# Patient Record
Sex: MALE | Race: WHITE | NOT HISPANIC OR LATINO | Employment: FULL TIME | URBAN - NONMETROPOLITAN AREA
[De-identification: names, ages, dates, MRNs, and addresses within clinical notes are randomized per-mention and may not be internally consistent; named-entity substitution may affect disease eponyms.]

---

## 2017-01-12 PROCEDURE — 87186 SC STD MICRODIL/AGAR DIL: CPT | Performed by: NURSE PRACTITIONER

## 2017-01-12 PROCEDURE — 87088 URINE BACTERIA CULTURE: CPT | Performed by: NURSE PRACTITIONER

## 2017-01-12 PROCEDURE — 87086 URINE CULTURE/COLONY COUNT: CPT | Performed by: NURSE PRACTITIONER

## 2017-03-14 ENCOUNTER — APPOINTMENT (OUTPATIENT)
Dept: GENERAL RADIOLOGY | Facility: HOSPITAL | Age: 22
End: 2017-03-14

## 2017-03-14 PROCEDURE — 73562 X-RAY EXAM OF KNEE 3: CPT

## 2021-03-18 ENCOUNTER — IMMUNIZATION (OUTPATIENT)
Dept: VACCINE CLINIC | Facility: HOSPITAL | Age: 26
End: 2021-03-18

## 2021-03-18 PROCEDURE — 91301 HC SARSCO02 VAC 100MCG/0.5ML IM: CPT | Performed by: OBSTETRICS & GYNECOLOGY

## 2021-03-18 PROCEDURE — 0011A: CPT | Performed by: OBSTETRICS & GYNECOLOGY

## 2021-04-15 ENCOUNTER — IMMUNIZATION (OUTPATIENT)
Dept: VACCINE CLINIC | Facility: HOSPITAL | Age: 26
End: 2021-04-15

## 2021-04-15 PROCEDURE — 0012A: CPT | Performed by: OBSTETRICS & GYNECOLOGY

## 2021-04-15 PROCEDURE — 91301 HC SARSCO02 VAC 100MCG/0.5ML IM: CPT | Performed by: OBSTETRICS & GYNECOLOGY

## 2024-11-30 ENCOUNTER — HOSPITAL ENCOUNTER (EMERGENCY)
Facility: HOSPITAL | Age: 29
Discharge: HOME | End: 2024-11-30
Payer: COMMERCIAL

## 2024-11-30 ENCOUNTER — APPOINTMENT (OUTPATIENT)
Dept: GENERAL RADIOLOGY | Facility: HOSPITAL | Age: 29
End: 2024-11-30
Payer: COMMERCIAL

## 2024-11-30 VITALS
TEMPERATURE: 98 F | DIASTOLIC BLOOD PRESSURE: 90 MMHG | WEIGHT: 260 LBS | RESPIRATION RATE: 16 BRPM | HEART RATE: 118 BPM | SYSTOLIC BLOOD PRESSURE: 150 MMHG | BODY MASS INDEX: 32.33 KG/M2 | OXYGEN SATURATION: 95 % | HEIGHT: 75 IN

## 2024-11-30 DIAGNOSIS — M76.61 TENDONITIS, ACHILLES, RIGHT: Primary | ICD-10-CM

## 2024-11-30 DIAGNOSIS — M79.661 RIGHT CALF PAIN: ICD-10-CM

## 2024-11-30 LAB — D DIMER PPP FEU-MCNC: 0.36 MCGFEU/ML (ref 0–0.5)

## 2024-11-30 PROCEDURE — 99283 EMERGENCY DEPT VISIT LOW MDM: CPT

## 2024-11-30 PROCEDURE — 73590 X-RAY EXAM OF LOWER LEG: CPT

## 2024-11-30 PROCEDURE — 85379 FIBRIN DEGRADATION QUANT: CPT

## 2024-11-30 PROCEDURE — 36415 COLL VENOUS BLD VENIPUNCTURE: CPT

## 2024-11-30 RX ORDER — PREDNISONE 20 MG/1
40 TABLET ORAL DAILY
Qty: 10 TABLET | Refills: 0 | Status: SHIPPED | OUTPATIENT
Start: 2024-11-30 | End: 2024-12-05

## 2024-11-30 RX ORDER — IBUPROFEN 600 MG/1
600 TABLET, FILM COATED ORAL 3 TIMES DAILY
Qty: 15 TABLET | Refills: 0 | Status: SHIPPED | OUTPATIENT
Start: 2024-11-30 | End: 2024-12-05

## 2024-11-30 NOTE — DISCHARGE INSTRUCTIONS
It was very nice to meet you, Lauri. Thank you for allowing us to take care of you today at [facility].    Today you were seen in the emergency department for your symptoms. Please understand that an ER evaluation is just the start of your evaluation. We do the best we can, but we are often unable to fully find what is causing your symptoms from one evaluation.  Because of this, the goal is to determine whether you need to be evaluated in the hospital or if it is safe for you to go home and see other doctors provided such as primary care physicians or specialist on an outpatient basis.     Like we discussed, I strongly urge that you follow up with your primary care doctor and orthopedics. Please call their office to set up an appointment as soon as possible so that you can be re-evaluated for improvement in your symptoms or for any other questions.  I have provided the information needed, including phone number, to call to set up an appointment below in these discharge papers.     Educational material has also been provided in the following pages regarding what we have discussed today.     MEDICATIONS PRESCRIBED: Steroids and NSAIDs as prescribed in addition to intermittent ice/heat application and rest.    Please return to the emergency room within 12-48 hours if you experience symptoms such as the following:   Fever, chills, chest pain or shortness of breath, pain with inspiration/expiration, pain that travels to your arms, neck or back, nausea, vomiting, severe headache, tearing pain in your chest, dizziness, feel as though you are about to pass out, OR if you have any worsening symptoms, or any other concerns.

## 2024-11-30 NOTE — Clinical Note
[facility] EMERGENCY DEPARTMENT  [address] [place] [address] AVE  [facility] [address]  Phone: [phone]    Lauri Velez was seen and treated in our emergency department on 11/30/2024.  He may return to work on 12/02/2024.         Thank you for choosing [facility].    Ham Galloway, APRN

## 2024-11-30 NOTE — ED PROVIDER NOTES
"Subjective   History of Present Illness  Patient is a 29-year-old male that presents to the emergency department sent from urgent care for complaints of a \"knot\" and intermittent pain to the right calf.  Patient reports this has been ongoing for the last week.  Denies any recent fall, trauma, or injury to cause pain.  Patient states he has been struggling with sciatica radiating down into the right lower extremity for the last few weeks but reports this is not pain that is consistent with sciatica.  Patient states the \"knot\" is not noticeable currently and it comes and goes in size and discomfort periodically.  Describes pain as a muscle pain.  Denies any numbness, tingling, or loss of sensation to either lower extremity.  Patient denies any recent travel.  Denies any hormone use.  Denies any tobacco or smoking history.  Denies any chest pain or shortness of breath.  Denies any lightheadedness, dizziness, blurred vision, headache or near syncope.  Denies any fevers, body aches, chills, cough or congestion.  Denies any abdominal pain, nausea, vomiting, constipation or diarrhea.  Denies any issues with ambulation.  Patient reports no significant past medical history and reports he takes no daily medications.        Review of Systems   Musculoskeletal:  Positive for arthralgias and myalgias.   All other systems reviewed and are negative.      Past Medical History:   Diagnosis Date    Fibrous dysplasia (monostotic), left ankle and foot        No Known Allergies    History reviewed. No pertinent surgical history.    History reviewed. No pertinent family history.    Social History     Socioeconomic History    Marital status: Unknown   Tobacco Use    Smoking status: Never    Smokeless tobacco: Never   Vaping Use    Vaping status: Never Used   Substance and Sexual Activity    Alcohol use: No    Drug use: No    Sexual activity: Defer           Objective   Physical Exam  Vitals and nursing note reviewed.   Constitutional:     "   Appearance: Normal appearance.      Comments: Nontoxic appearing. In no acute distress.    HENT:      Head: Normocephalic and atraumatic.      Right Ear: External ear normal.      Left Ear: External ear normal.      Nose: Nose normal.      Mouth/Throat:      Mouth: Mucous membranes are moist.      Pharynx: Oropharynx is clear.   Eyes:      Extraocular Movements: Extraocular movements intact.      Conjunctiva/sclera: Conjunctivae normal.      Pupils: Pupils are equal, round, and reactive to light.   Cardiovascular:      Rate and Rhythm: Normal rate.      Pulses: Normal pulses.      Heart sounds: Normal heart sounds.   Pulmonary:      Effort: Pulmonary effort is normal. No respiratory distress.      Breath sounds: Normal breath sounds. No wheezing.   Chest:      Chest wall: No tenderness.   Abdominal:      General: Bowel sounds are normal. There is no distension.      Palpations: Abdomen is soft.      Tenderness: There is no abdominal tenderness. There is no right CVA tenderness, left CVA tenderness, guarding or rebound.   Musculoskeletal:         General: Tenderness present. Normal range of motion.      Cervical back: Normal range of motion and neck supple.      Right lower leg: No edema.      Left lower leg: No edema.      Comments: No obvious signs of edema or swelling noted to either lower extremity.  No palpable knot or mass noted to the right lower extremity.  Patient does report minor tenderness upon palpation localized to the  right calf.  No erythema, warmth noted to touch or wound noted.  Patient denies any numbness, tingling, or loss of sensation to the affected extremity.  Pulses are palpable, strong equal bilaterally.  Normal cap refill.  Patient appears to be neurovascular intact.   Skin:     General: Skin is warm and dry.      Capillary Refill: Capillary refill takes less than 2 seconds.   Neurological:      General: No focal deficit present.      Mental Status: He is alert and oriented to person,  "place, and time. Mental status is at baseline.   Psychiatric:         Mood and Affect: Mood normal.         Behavior: Behavior normal.         Thought Content: Thought content normal.         Judgment: Judgment normal.       Labs Reviewed   D-DIMER, QUANTITATIVE - Normal    Narrative:     According to the assay 's published package insert, a normal (<0.50 MCGFEU/mL) D-dimer result in conjunction with a non-high clinical probability assessment, excludes deep vein thrombosis (DVT) and pulmonary embolism (PE) with high sensitivity.    D-dimer values increase with age and this can make VTE exclusion of an older population difficult. To address this, the American College of Phys[employer], based on best available evidence and recent guidelines, recommends that clinicians use age-adjusted D-dimer thresholds in patients greater than 50 years of age with: a) a low probability of PE who do not meet all Pulmonary Embolism Rule Out Criteria, or b) in those with intermediate probability of PE.   The formula for an age-adjusted D-dimer cut-off is \"age/100\".  For example, a 60 year old patient would have an age-adjusted cut-off of 0.60 MCGFEU/mL and an 80 year old 0.80 MCGFEU/mL.      XR Tibia Fibula 2 View Right   Final Result   No fracture, no acute osseous abnormality.       This report was signed and finalized on 11/30/2024 2:56 PM by Dr. Abdiaziz Cheng MD.               Procedures           ED Course                                                       Medical Decision Making  Lauri Velez is a 29 y.o. male who presents to the ED  sent from urgent care for complaints of a \"knot\" and intermittent pain to the right calf.  Patient reports this has been ongoing for the last week.  Denies any recent fall, trauma, or injury to cause pain.  Patient states he has been struggling with sciatica radiating down into the right lower extremity for the last few weeks but reports this is not pain that is consistent with " "sciatica.  Patient states the \"knot\" is not noticeable currently and it comes and goes in size and discomfort periodically.  Describes pain as a muscle pain.  Denies any numbness, tingling, or loss of sensation to either lower extremity.  Patient denies any recent travel.  Denies any hormone use.  Denies any tobacco or smoking history.  Denies any chest pain or shortness of breath.  Denies any lightheadedness, dizziness, blurred vision, headache or near syncope.  Denies any fevers, body aches, chills, cough or congestion.  Denies any abdominal pain, nausea, vomiting, constipation or diarrhea.  Denies any issues with ambulation.  Patient reports no significant past medical history and reports he takes no daily medications.    Patient was non-toxic appearing on arrival. No acute distress was noted.  Vital signs stable.     Patient's presentation raises suspicion for differentials including, but not limited to, DVT, muscle strain, lipoma, fracture.      Past medical history, surgical history, and medication regimen reviewed.     Previous notes, labs, imaging, and more reviewed.    Wells score for DVT: 1 point, low risk. D-dimer normal.     Please refer to above section of note for lab and imaging results that were reviewed and interpreted by radiology as well as attending physician.     Given findings described above, patient's presentation is likely consistent with tendinitis.     I had an in-depth discussion with the patient regarding reassuring physical exam findings as there is no evidence of a not or mass noted to the right calf and there is no signs of swelling, erythema, or edema noted to the right lower extremity.  Discussed that D-dimer was negative.  Also discussed that x-ray reveals a small posterior spur is seen at the Achilles insertionon the calcaneus that could be causing some of patient's symptoms.  Discussed that patient will be treated for tendinitis and will be discharged with prescriptions for " steroids and anti-inflammatories.  Discussed that patient will need to use intermittent ice and heat application to help with pain and symptom management in addition to following up with orthopedics for further evaluation and treatment of continued symptoms.  Patient was educated on concerning signs and symptoms that warrant a quick return to ED and verbalized understanding of this. I answered all the questions regarding the emergency department evaluation, diagnosis, and treatment plan in plain and simple language that was understandable. We discussed that due to always having some diagnostic uncertainty while in the ER, there is always a chance that symptoms may change or new symptoms may reveal themselves after being discharged. Because of this, I stressed the importance of Lauri following up with their PCP and orthopedics. Patient informed that appointment will need to be done by calling their office to set up an appointment within the next few days or as soon as reasonably possible so that the symptoms can be re-evaluated for improvement or for any other questions. I also gave Lauri common sense return precautions and prompted patient to return to the emergency department within 24 - 48hrs if there are any new, worsening, or concerning symptoms. The patient verbalized understanding of the discharge instructions and agreed with them. Lauri was discharged in stable condition.     Dragon disclaimer:  Parts of this note may be an electronic transcription/translation of spoken language to printed text using the Dragon dictation system.       Problems Addressed:  Right calf pain: complicated acute illness or injury  Tendonitis, Achilles, right: complicated acute illness or injury    Amount and/or Complexity of Data Reviewed  Radiology: ordered.    Risk  Prescription drug management.        Final diagnoses:   Tendonitis, Achilles, right   Right calf pain       ED Disposition  ED Disposition       ED Disposition    Discharge    Condition   Stable    Comment   --               Masoud Wise MD  [address]  [address]  [facility] [address]  [phone]    Schedule an appointment as soon as possible for a visit       [facility] ORTHOPEDICS  [address] [facility] [address] [phone]  Schedule an appointment as soon as possible for a visit       [facility] EMERGENCY DEPARTMENT  [address]  [facility] [address] [phone]    If symptoms worsen         Medication List        New Prescriptions      ibuprofen 600 MG tablet  Commonly known as: ADVIL,MOTRIN  Take 1 tablet by mouth 3 (Three) Times a Day for 5 days.     predniSONE 20 MG tablet  Commonly known as: DELTASONE  Take 2 tablets by mouth Daily for 5 days.               Where to Get Your Medications        These medications [facility] [place] [facility] [address] - [phone]  - [phone]   [address] [facility] [address]      Phone: [phone]   ibuprofen 600 MG tablet  predniSONE 20 MG tablet            Ham Galloway, APRN  11/30/24 [id number]

## 2024-12-16 ENCOUNTER — OFFICE VISIT (OUTPATIENT)
Age: 29
End: 2024-12-16
Payer: COMMERCIAL

## 2024-12-16 VITALS — WEIGHT: 260 LBS | HEIGHT: 75 IN | BODY MASS INDEX: 32.33 KG/M2

## 2024-12-16 DIAGNOSIS — M76.61 TENDONITIS, ACHILLES, RIGHT: Primary | ICD-10-CM

## 2024-12-16 DIAGNOSIS — M79.671 RIGHT FOOT PAIN: ICD-10-CM

## 2024-12-16 DIAGNOSIS — M67.01 CONTRACTURE OF BOTH ACHILLES TENDONS: ICD-10-CM

## 2024-12-16 DIAGNOSIS — M67.02 CONTRACTURE OF BOTH ACHILLES TENDONS: ICD-10-CM

## 2024-12-16 PROCEDURE — 99203 OFFICE O/P NEW LOW 30 MIN: CPT | Performed by: PODIATRIST

## 2024-12-16 RX ORDER — METHYLPREDNISOLONE 4 MG/1
TABLET ORAL
Qty: 1 KIT | Refills: 0 | Status: SHIPPED | OUTPATIENT
Start: 2024-12-16

## 2024-12-16 NOTE — PATIENT INSTRUCTIONS
Follow up after labs and x-rays are done       
Then use your toes to push the towel back into place.  Repeat 8 to 12 times.  It's a good idea to repeat these steps with your other foot.  Make this exercise more challenging by placing a weighted object, such as a soup can, on the other end of the towel.  Oden pickups    Put some marbles, dice, or small smooth rocks on the floor next to a cup.  Sit in a chair, and use the toes of your affected foot to lift up one item from the floor. Then try to put the item in the cup.  Repeat 8 to 12 times.  It's a good idea to repeat these steps with your other foot.  Follow-up care is a key part of your treatment and safety. Be sure to make and go to all appointments, and call your doctor if you are having problems. It's also a good idea to know your test results and keep a list of the medicines you take.  Current as of: July 17, 2023  Content Version: 14.2  © 2024 [employer].   Care instructions adapted under license by [employer]. If you have questions about a medical condition or this instruction, always ask your healthcare professional. [employer] disclaims any warranty or liability for your use of this information.         This is one of the recommended inserts.  These can be found on Amazon as well as at some local pharmacies.

## 2024-12-16 NOTE — PROGRESS NOTES
[facility]  [facility] ORTHOPEDICS  [address]  [facility] [id number] [phone]     Patient: Artem Madera   YOB: 1995   Date: 12/16/2024   Visit Type:  Consult    Body Part: Foot right    When did the symptoms being/Date of Onset or date of surgery? Sign/ Symptoms Started 11/20/2024    Went to [facility] due to the pain being uncontrollable 10 days later       Where did the injury happen? OTHER-     How did the injury happen? Pt states its over use of being on his feet all day. The pain started becoming a problem within the last few weeks.     If over 55, have you bell an Osteoporosis Screening in the last 2 years? NA    History of Present Illness  Chief Complaint   Patient presents with    Consultation Right Foot        This is a 29 y.o. male  presents today complaining of pain in his right foot.  Mainly in the back of the heel.  He does relate tightness in both of his legs right greater than left.  Denies any trauma or injury.  Relates intermittent of several months duration.  Gradual onset.  Progressively worse with time.  No previous treatments.  Pain level can be 6-7 of 10 with 10 being the worst.    Review of Systems  System  Neg/Pos  Details  Constitutional  Negative  Chills, Fatigue, Fever and Night Sweats  Respiratory  Negative  Chest Pain, Cough and Dyspnea  Cardio   Negative  Leg Swelling  GI   Negative  Abdominal Pain, Constipation, Nausea and Vomiting     Negative  Urinary Incontinence   Endocrine  Negative  Weight Gain and Weight Loss  MS   Negative  Except as noted in HPI and Chief Complaint    History reviewed. No pertinent past medical history.   History reviewed. No pertinent surgical history.   Social History     Socioeconomic History    Marital status: Single     Spouse name: None    Number of children: None    Years of education: None    Highest education level: None   Tobacco Use    Smoking status: Never

## 2025-03-17 ENCOUNTER — OFFICE VISIT (OUTPATIENT)
Age: 30
End: 2025-03-17
Payer: COMMERCIAL

## 2025-03-17 DIAGNOSIS — M77.51 RETROCALCANEAL BURSITIS (BACK OF HEEL), RIGHT: ICD-10-CM

## 2025-03-17 DIAGNOSIS — M79.671 RIGHT FOOT PAIN: ICD-10-CM

## 2025-03-17 DIAGNOSIS — M67.01 CONTRACTURE OF BOTH ACHILLES TENDONS: ICD-10-CM

## 2025-03-17 DIAGNOSIS — Q68.8 OS TRIGONUM SYNDROME: ICD-10-CM

## 2025-03-17 DIAGNOSIS — M76.61 TENDONITIS, ACHILLES, RIGHT: Primary | ICD-10-CM

## 2025-03-17 DIAGNOSIS — M67.02 CONTRACTURE OF BOTH ACHILLES TENDONS: ICD-10-CM

## 2025-03-17 DIAGNOSIS — M92.61 HAGLUND'S DEFORMITY, RIGHT: ICD-10-CM

## 2025-03-17 PROCEDURE — 99213 OFFICE O/P EST LOW 20 MIN: CPT | Performed by: PODIATRIST

## 2025-03-17 NOTE — PROGRESS NOTES
is mild fluctuance anterior to the Achilles tendon.  Does have significant limitation of ankle joint dorsiflexion with the knee extended and flexed.  Gait Examination: Antalgic    Imaging  Xray Result (most recent):  XR FOOT RIGHT (MIN 3 VIEWS) 03/17/2025 (Needs Review)  This result has not been signed. Information might be incomplete.    Narrative  Foot Xray    Foot Xray 3 views. right  taken in office today. Include AP, lateral, lateral oblique, reveal Adequate. Bone Density.  There is a posterior calcaneal spur.  There is a large os trigonum.  No acute abnormality. Image quality is excellent. Interpreted by Kaveh Amanda II, DPM    Posterior calcaneal spurring  Os trigonum  No acute abnormality          Plan    ICD-10-CM    1. Tendonitis, Achilles, right  M76.61       2. Right foot pain  M79.671 XR FOOT RIGHT (MIN 3 VIEWS)      3. Contracture of both Achilles tendons  M67.01     M67.02       4. 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  Continue at home physical therapy.  I did recommend MRI evaluation of his hindfoot and ankle.  I do want to evaluate his Achilles tendon as well as for signs of posterior ankle impingement secondary to the os trigonum.      Return After MRI.      Patient given educational materials - see patient instructions.   Discussed use, benefit, and side effects of prescribed medications.  All patient questions answered.  Pt voiced understanding. Patient agreed with treatment plan. Follow up as needed.    This dictation was generated by voice recognition computer software. Although all attempts are made to edit the dictation for accuracy, there may be errors in the transcription that are not intended.    Electronically signed by Kaveh Amanda II, DPM on 3/17/2025 at 10:28 AM

## 2025-03-25 ENCOUNTER — HOSPITAL ENCOUNTER (OUTPATIENT)
Dept: MRI IMAGING | Age: 30
Discharge: HOME | End: 2025-03-25
Payer: COMMERCIAL

## 2025-03-25 DIAGNOSIS — Q68.8 OS TRIGONUM SYNDROME: ICD-10-CM

## 2025-03-25 DIAGNOSIS — M77.51 RETROCALCANEAL BURSITIS (BACK OF HEEL), RIGHT: ICD-10-CM

## 2025-03-25 DIAGNOSIS — M76.61 TENDONITIS, ACHILLES, RIGHT: ICD-10-CM

## 2025-03-25 PROCEDURE — 73721 MRI JNT OF LWR EXTRE W/O DYE: CPT

## 2025-04-07 ENCOUNTER — OFFICE VISIT (OUTPATIENT)
Age: 30
End: 2025-04-07
Payer: COMMERCIAL

## 2025-04-07 VITALS — BODY MASS INDEX: 32.33 KG/M2 | WEIGHT: 260 LBS | HEIGHT: 75 IN

## 2025-04-07 DIAGNOSIS — Q68.8 OS TRIGONUM SYNDROME: ICD-10-CM

## 2025-04-07 DIAGNOSIS — M79.671 RIGHT FOOT PAIN: ICD-10-CM

## 2025-04-07 DIAGNOSIS — M67.01 CONTRACTURE OF BOTH ACHILLES TENDONS: ICD-10-CM

## 2025-04-07 DIAGNOSIS — M92.61 HAGLUND'S DEFORMITY, RIGHT: ICD-10-CM

## 2025-04-07 DIAGNOSIS — M76.61 TENDONITIS, ACHILLES, RIGHT: ICD-10-CM

## 2025-04-07 DIAGNOSIS — M67.02 CONTRACTURE OF BOTH ACHILLES TENDONS: ICD-10-CM

## 2025-04-07 DIAGNOSIS — M77.51 RETROCALCANEAL BURSITIS (BACK OF HEEL), RIGHT: Primary | ICD-10-CM

## 2025-04-07 PROCEDURE — 99213 OFFICE O/P EST LOW 20 MIN: CPT | Performed by: PODIATRIST

## 2025-04-07 RX ORDER — DICLOFENAC SODIUM 75 MG/1
75 TABLET, DELAYED RELEASE ORAL 2 TIMES DAILY
Qty: 30 TABLET | Refills: 0 | Status: SHIPPED | OUTPATIENT
Start: 2025-04-07

## 2025-04-07 RX ORDER — METHYLPREDNISOLONE 4 MG/1
TABLET ORAL
Qty: 1 KIT | Refills: 0 | Status: SHIPPED | OUTPATIENT
Start: 2025-04-07 | End: 2025-04-07

## 2025-04-07 RX ORDER — METHYLPREDNISOLONE 4 MG/1
TABLET ORAL
Qty: 1 KIT | Refills: 0 | Status: SHIPPED | OUTPATIENT
Start: 2025-04-07

## 2025-04-07 NOTE — PROGRESS NOTES
followed by diclofenac 75 mg #60.  1 tablet p.o. twice daily until gone.  Risks and benefits these medications were discussed and reviewed.  Return to clinic in 6 weeks for follow-up reevaluation.      Return in about 6 weeks (around 5/19/2025).      Patient given educational materials - see patient instructions.   Discussed use, benefit, and side effects of prescribed medications.  All patient questions answered.  Pt voiced understanding. Patient agreed with treatment plan. Follow up as needed.    This dictation was generated by voice recognition computer software. Although all attempts are made to edit the dictation for accuracy, there may be errors in the transcription that are not intended.    Electronically signed by Kaveh Amanda II, DPM on 4/7/2025 at 10:51 AM

## 2025-05-19 ENCOUNTER — OFFICE VISIT (OUTPATIENT)
Age: 30
End: 2025-05-19
Payer: COMMERCIAL

## 2025-05-19 VITALS — BODY MASS INDEX: 31.08 KG/M2 | WEIGHT: 250 LBS | HEIGHT: 75 IN

## 2025-05-19 DIAGNOSIS — M67.01 CONTRACTURE OF BOTH ACHILLES TENDONS: ICD-10-CM

## 2025-05-19 DIAGNOSIS — M67.02 CONTRACTURE OF BOTH ACHILLES TENDONS: ICD-10-CM

## 2025-05-19 DIAGNOSIS — Q68.8 OS TRIGONUM SYNDROME: Primary | ICD-10-CM

## 2025-05-19 DIAGNOSIS — M79.671 RIGHT FOOT PAIN: ICD-10-CM

## 2025-05-19 DIAGNOSIS — M77.51 RETROCALCANEAL BURSITIS (BACK OF HEEL), RIGHT: ICD-10-CM

## 2025-05-19 DIAGNOSIS — M92.61 HAGLUND'S DEFORMITY, RIGHT: ICD-10-CM

## 2025-05-19 PROCEDURE — 99213 OFFICE O/P EST LOW 20 MIN: CPT | Performed by: PODIATRIST

## 2025-05-19 RX ORDER — DICLOFENAC SODIUM 75 MG/1
75 TABLET, DELAYED RELEASE ORAL 2 TIMES DAILY
Qty: 60 TABLET | Refills: 1 | Status: SHIPPED | OUTPATIENT
Start: 2025-05-19

## 2025-05-19 NOTE — PROGRESS NOTES
lower extremities.  Adequate pedal hair growth present bilaterally.  Musculoskeletal: There is pain with palpation the posterior aspect the right heel at the insertion Achilles tendon.  There are no defects or nodular enlargement.  There is mild fluctuance anterior to the Achilles tendon.  Does have significant limitation of ankle joint dorsiflexion with the knee extended and flexed.  There is significant pain posterior ankle with plantarflexion as well as with palpation of the posterior ankle.  Gait Examination: Antalgic      Imaging  MRI Result (most recent):  MRI ANKLE RIGHT WO CONTRAST 03/25/2025    Narrative  EXAM: MRI RIGHT ANKLE    HISTORY: Tendonitis, Achilles, right, Retrocalcaneal bursitis (back of heel), right, Os trigonum syndrome.    TECHNIQUE: Routine noncontrast multiplanar multisequence MR imaging of the ankle was performed at 1.5 T.    COMPARISON: None.    FINDINGS:    ACHILLES: Intact without significant degeneration or tear.    SINUS TARSI: There is edema in the space with some replacement of fat.  Majority of fat is preserved.    PLANTAR FASCIA: No masses or nodules detected. No evidence of fasciitis.    BONE MARROW AND CARTILAGE: No bone contusion or fracture detected. Tibiotalar, subtalar, and cartilage of the visualized articulation of the hind and midfoot is unremarkable without significant defect or evidence of secondary degenerative changes in the  marrow. Os trigonum is noted and demonstrates normal marrow signal.  Trace marrow reactive changes posterior aspect of the talus at the pseudoarticulation with the os trigonum.    JOINT SPACE: No effusion is detected.    PERONEUS LONGUS AND BREVIS TENDONS: There is flattening of the peroneus brevis tendon without split longitudinal tear.  Longus appears intact.  No dislocation.  No significant tendinopathy.    TIBIALIS POSTERIOR: Intact.    SPRING LIGAMENT: Intact.    FLEXOR HALLUX LONGUS AND FLEXOR DIGITORUM LONGUS TENDONS: Intact.    TARSAL

## 2025-07-21 ENCOUNTER — OFFICE VISIT (OUTPATIENT)
Age: 30
End: 2025-07-21
Payer: COMMERCIAL

## 2025-07-21 DIAGNOSIS — M76.61 TENDONITIS, ACHILLES, RIGHT: ICD-10-CM

## 2025-07-21 DIAGNOSIS — M79.671 RIGHT FOOT PAIN: ICD-10-CM

## 2025-07-21 DIAGNOSIS — M67.01 CONTRACTURE OF BOTH ACHILLES TENDONS: ICD-10-CM

## 2025-07-21 DIAGNOSIS — M92.61 HAGLUND'S DEFORMITY, RIGHT: ICD-10-CM

## 2025-07-21 DIAGNOSIS — M67.02 CONTRACTURE OF BOTH ACHILLES TENDONS: ICD-10-CM

## 2025-07-21 DIAGNOSIS — Q68.8 OS TRIGONUM SYNDROME: Primary | ICD-10-CM

## 2025-07-21 DIAGNOSIS — M77.51 RETROCALCANEAL BURSITIS (BACK OF HEEL), RIGHT: ICD-10-CM

## 2025-07-21 PROCEDURE — 99213 OFFICE O/P EST LOW 20 MIN: CPT | Performed by: PODIATRIST

## 2025-07-21 RX ORDER — MELOXICAM 15 MG/1
15 TABLET ORAL DAILY
Qty: 30 TABLET | Refills: 1 | Status: SHIPPED | OUTPATIENT
Start: 2025-07-21

## 2025-07-21 NOTE — PROGRESS NOTES
no      Social History     Occupational History    Not on file   Tobacco Use    Smoking status: Never    Smokeless tobacco: Never   Substance and Sexual Activity    Alcohol use: Not on file    Drug use: Not on file    Sexual activity: Not on file        Tobacco Use      Smoking status: Never      Smokeless tobacco: Never     History reviewed. No pertinent family history.     Medications  Current Outpatient Medications   Medication Sig Dispense Refill    meloxicam (MOBIC) 15 MG tablet Take 1 tablet by mouth daily 30 tablet 1    diclofenac (VOLTAREN) 75 MG EC tablet Take 1 tablet by mouth 2 times daily 60 tablet 1    diclofenac (VOLTAREN) 75 MG EC tablet Take 1 tablet by mouth 2 times daily (Patient not taking: Reported on 5/19/2025) 30 tablet 0    methylPREDNISolone (MEDROL, DENISE,) 4 MG tablet Take by mouth. (Patient not taking: Reported on 5/19/2025) 1 kit 0    methylPREDNISolone (MEDROL, DENISE,) 4 MG tablet Take by mouth. 1 kit 0     No current facility-administered medications for this visit.        Allergies  No Known Allergies     There were no vitals taken for this visit.     Physical Exam   Physical Examination:    General: The patient is a Well Nourished 29 y.o. male who is alert and oriented x3 in no distress. The patient is cooperative during the exam.  Psychological: Is a pleasant male, good mood and affect, answers questions appropriately.  Head and Neck: Normocephalic, Atraumatic. Neck supple, no evidence of masses.   Abdomen: Soft, nontender, nondistended.  Eyes: Perrla. Extraocular movements intact.   Respiratory: Rate and effort within normal limits.   Vascular: Palpable dorsalis pedis and posterior tibial pulse bilaterally.  No edema bilaterally.  Neurological: Adequate sharp and dull sensation bilateral lower extremities.  Negative Tinel's sign tibial nerve bilaterally.  Dermatological: Skin is cool but dry and supple with no open lesions bilateral lower extremities.  Adequate pedal hair growth present

## 2025-09-03 ENCOUNTER — OFFICE VISIT (OUTPATIENT)
Age: 30
End: 2025-09-03
Payer: COMMERCIAL

## 2025-09-03 VITALS — HEIGHT: 75 IN | WEIGHT: 250 LBS | BODY MASS INDEX: 31.08 KG/M2

## 2025-09-03 DIAGNOSIS — S93.499A PARTIAL TEAR OF LIGAMENT OF LATERAL ASPECT OF ANKLE: Primary | ICD-10-CM

## 2025-09-03 DIAGNOSIS — M25.571 ACUTE RIGHT ANKLE PAIN: ICD-10-CM

## 2025-09-03 PROCEDURE — 99213 OFFICE O/P EST LOW 20 MIN: CPT | Performed by: NURSE PRACTITIONER

## 2025-09-03 RX ORDER — CYCLOBENZAPRINE HCL 5 MG
5 TABLET ORAL 3 TIMES DAILY PRN
COMMUNITY
Start: 2025-08-29

## 2025-09-03 ASSESSMENT — ENCOUNTER SYMPTOMS
NAUSEA: 0
DIARRHEA: 0
BLOOD IN STOOL: 0
VOMITING: 0
SHORTNESS OF BREATH: 0
COLOR CHANGE: 0
COUGH: 0
CONSTIPATION: 0

## 2025-09-04 ENCOUNTER — TELEPHONE (OUTPATIENT)
Age: 30
End: 2025-09-04